# Patient Record
Sex: FEMALE | Race: BLACK OR AFRICAN AMERICAN | NOT HISPANIC OR LATINO | URBAN - METROPOLITAN AREA
[De-identification: names, ages, dates, MRNs, and addresses within clinical notes are randomized per-mention and may not be internally consistent; named-entity substitution may affect disease eponyms.]

---

## 2021-06-28 ENCOUNTER — OUTPATIENT (OUTPATIENT)
Dept: OUTPATIENT SERVICES | Facility: HOSPITAL | Age: 51
LOS: 1 days | End: 2021-06-28
Payer: COMMERCIAL

## 2021-06-28 DIAGNOSIS — Z01.818 ENCOUNTER FOR OTHER PREPROCEDURAL EXAMINATION: ICD-10-CM

## 2021-06-28 LAB
ALBUMIN SERPL ELPH-MCNC: 3.9 G/DL — SIGNIFICANT CHANGE UP (ref 3.3–5)
ALP SERPL-CCNC: 61 U/L — SIGNIFICANT CHANGE UP (ref 40–120)
ALT FLD-CCNC: 11 U/L — SIGNIFICANT CHANGE UP (ref 10–45)
ANION GAP SERPL CALC-SCNC: 7 MMOL/L — SIGNIFICANT CHANGE UP (ref 5–17)
APTT BLD: 29.3 SEC — SIGNIFICANT CHANGE UP (ref 27.5–35.5)
AST SERPL-CCNC: 24 U/L — SIGNIFICANT CHANGE UP (ref 10–40)
BILIRUB SERPL-MCNC: 0.2 MG/DL — SIGNIFICANT CHANGE UP (ref 0.2–1.2)
BLD GP AB SCN SERPL QL: NEGATIVE — SIGNIFICANT CHANGE UP
BLD GP AB SCN SERPL QL: NEGATIVE — SIGNIFICANT CHANGE UP
BUN SERPL-MCNC: 7 MG/DL — SIGNIFICANT CHANGE UP (ref 7–23)
CALCIUM SERPL-MCNC: 9.2 MG/DL — SIGNIFICANT CHANGE UP (ref 8.4–10.5)
CHLORIDE SERPL-SCNC: 104 MMOL/L — SIGNIFICANT CHANGE UP (ref 96–108)
CO2 SERPL-SCNC: 26 MMOL/L — SIGNIFICANT CHANGE UP (ref 22–31)
CREAT SERPL-MCNC: 0.77 MG/DL — SIGNIFICANT CHANGE UP (ref 0.5–1.3)
GLUCOSE SERPL-MCNC: 74 MG/DL — SIGNIFICANT CHANGE UP (ref 70–99)
HCG SERPL-ACNC: <0 MIU/ML — SIGNIFICANT CHANGE UP
HCT VFR BLD CALC: 35.3 % — SIGNIFICANT CHANGE UP (ref 34.5–45)
HGB BLD-MCNC: 11.8 G/DL — SIGNIFICANT CHANGE UP (ref 11.5–15.5)
INR BLD: 1.02 — SIGNIFICANT CHANGE UP (ref 0.88–1.16)
MCHC RBC-ENTMCNC: 29.8 PG — SIGNIFICANT CHANGE UP (ref 27–34)
MCHC RBC-ENTMCNC: 33.4 GM/DL — SIGNIFICANT CHANGE UP (ref 32–36)
MCV RBC AUTO: 89.1 FL — SIGNIFICANT CHANGE UP (ref 80–100)
NRBC # BLD: 0 /100 WBCS — SIGNIFICANT CHANGE UP (ref 0–0)
PLATELET # BLD AUTO: 181 K/UL — SIGNIFICANT CHANGE UP (ref 150–400)
POTASSIUM SERPL-MCNC: 4.1 MMOL/L — SIGNIFICANT CHANGE UP (ref 3.5–5.3)
POTASSIUM SERPL-SCNC: 4.1 MMOL/L — SIGNIFICANT CHANGE UP (ref 3.5–5.3)
PROT SERPL-MCNC: 7.1 G/DL — SIGNIFICANT CHANGE UP (ref 6–8.3)
PROTHROM AB SERPL-ACNC: 12.2 SEC — SIGNIFICANT CHANGE UP (ref 10.6–13.6)
RBC # BLD: 3.96 M/UL — SIGNIFICANT CHANGE UP (ref 3.8–5.2)
RBC # FLD: 12.4 % — SIGNIFICANT CHANGE UP (ref 10.3–14.5)
RH IG SCN BLD-IMP: POSITIVE — SIGNIFICANT CHANGE UP
RH IG SCN BLD-IMP: POSITIVE — SIGNIFICANT CHANGE UP
SODIUM SERPL-SCNC: 137 MMOL/L — SIGNIFICANT CHANGE UP (ref 135–145)
WBC # BLD: 3.48 K/UL — LOW (ref 3.8–10.5)
WBC # FLD AUTO: 3.48 K/UL — LOW (ref 3.8–10.5)

## 2021-06-28 PROCEDURE — 71046 X-RAY EXAM CHEST 2 VIEWS: CPT

## 2021-06-28 PROCEDURE — 93005 ELECTROCARDIOGRAM TRACING: CPT

## 2021-06-28 PROCEDURE — 84702 CHORIONIC GONADOTROPIN TEST: CPT

## 2021-06-28 PROCEDURE — 80053 COMPREHEN METABOLIC PANEL: CPT

## 2021-06-28 PROCEDURE — 85610 PROTHROMBIN TIME: CPT

## 2021-06-28 PROCEDURE — 71046 X-RAY EXAM CHEST 2 VIEWS: CPT | Mod: 26

## 2021-06-28 PROCEDURE — 93010 ELECTROCARDIOGRAM REPORT: CPT

## 2021-06-28 PROCEDURE — 85027 COMPLETE CBC AUTOMATED: CPT

## 2021-06-28 PROCEDURE — 86900 BLOOD TYPING SEROLOGIC ABO: CPT

## 2021-06-28 PROCEDURE — 86901 BLOOD TYPING SEROLOGIC RH(D): CPT

## 2021-06-28 PROCEDURE — 86850 RBC ANTIBODY SCREEN: CPT

## 2021-06-28 PROCEDURE — 85730 THROMBOPLASTIN TIME PARTIAL: CPT

## 2021-06-29 VITALS
RESPIRATION RATE: 18 BRPM | HEIGHT: 62 IN | OXYGEN SATURATION: 98 % | WEIGHT: 175.27 LBS | HEART RATE: 68 BPM | TEMPERATURE: 98 F | SYSTOLIC BLOOD PRESSURE: 103 MMHG | DIASTOLIC BLOOD PRESSURE: 68 MMHG

## 2021-06-30 ENCOUNTER — INPATIENT (INPATIENT)
Facility: HOSPITAL | Age: 51
LOS: 1 days | Discharge: HOME | DRG: 743 | End: 2021-07-02
Attending: OBSTETRICS & GYNECOLOGY | Admitting: OBSTETRICS & GYNECOLOGY
Payer: COMMERCIAL

## 2021-06-30 DIAGNOSIS — Z98.890 OTHER SPECIFIED POSTPROCEDURAL STATES: Chronic | ICD-10-CM

## 2021-06-30 DIAGNOSIS — K60.3 ANAL FISTULA: Chronic | ICD-10-CM

## 2021-06-30 LAB
GLUCOSE BLDC GLUCOMTR-MCNC: 85 MG/DL — SIGNIFICANT CHANGE UP (ref 70–99)
GLUCOSE BLDC GLUCOMTR-MCNC: 89 MG/DL — SIGNIFICANT CHANGE UP (ref 70–99)
GLUCOSE BLDC GLUCOMTR-MCNC: 91 MG/DL — SIGNIFICANT CHANGE UP (ref 70–99)
HCT VFR BLD CALC: 34.8 % — SIGNIFICANT CHANGE UP (ref 34.5–45)
HGB BLD-MCNC: 11.7 G/DL — SIGNIFICANT CHANGE UP (ref 11.5–15.5)
MCHC RBC-ENTMCNC: 30.2 PG — SIGNIFICANT CHANGE UP (ref 27–34)
MCHC RBC-ENTMCNC: 33.6 GM/DL — SIGNIFICANT CHANGE UP (ref 32–36)
MCV RBC AUTO: 89.7 FL — SIGNIFICANT CHANGE UP (ref 80–100)
NRBC # BLD: 0 /100 WBCS — SIGNIFICANT CHANGE UP (ref 0–0)
PLATELET # BLD AUTO: 146 K/UL — LOW (ref 150–400)
RBC # BLD: 3.88 M/UL — SIGNIFICANT CHANGE UP (ref 3.8–5.2)
RBC # FLD: 12.5 % — SIGNIFICANT CHANGE UP (ref 10.3–14.5)
WBC # BLD: 7.63 K/UL — SIGNIFICANT CHANGE UP (ref 3.8–10.5)
WBC # FLD AUTO: 7.63 K/UL — SIGNIFICANT CHANGE UP (ref 3.8–10.5)

## 2021-06-30 PROCEDURE — 74018 RADEX ABDOMEN 1 VIEW: CPT | Mod: 26

## 2021-06-30 PROCEDURE — 88307 TISSUE EXAM BY PATHOLOGIST: CPT | Mod: 26

## 2021-06-30 RX ORDER — ONDANSETRON 8 MG/1
4 TABLET, FILM COATED ORAL ONCE
Refills: 0 | Status: DISCONTINUED | OUTPATIENT
Start: 2021-06-30 | End: 2021-07-02

## 2021-06-30 RX ORDER — ACETAMINOPHEN 500 MG
1000 TABLET ORAL EVERY 6 HOURS
Refills: 0 | Status: DISCONTINUED | OUTPATIENT
Start: 2021-06-30 | End: 2021-07-02

## 2021-06-30 RX ORDER — SIMETHICONE 80 MG/1
80 TABLET, CHEWABLE ORAL EVERY 6 HOURS
Refills: 0 | Status: DISCONTINUED | OUTPATIENT
Start: 2021-06-30 | End: 2021-07-02

## 2021-06-30 RX ORDER — KETOROLAC TROMETHAMINE 30 MG/ML
30 SYRINGE (ML) INJECTION EVERY 8 HOURS
Refills: 0 | Status: DISCONTINUED | OUTPATIENT
Start: 2021-06-30 | End: 2021-07-01

## 2021-06-30 RX ORDER — OXYCODONE HYDROCHLORIDE 5 MG/1
10 TABLET ORAL EVERY 6 HOURS
Refills: 0 | Status: DISCONTINUED | OUTPATIENT
Start: 2021-06-30 | End: 2021-07-02

## 2021-06-30 RX ORDER — OXYCODONE HYDROCHLORIDE 5 MG/1
5 TABLET ORAL EVERY 6 HOURS
Refills: 0 | Status: DISCONTINUED | OUTPATIENT
Start: 2021-06-30 | End: 2021-07-02

## 2021-06-30 RX ORDER — HYDROMORPHONE HYDROCHLORIDE 2 MG/ML
0.5 INJECTION INTRAMUSCULAR; INTRAVENOUS; SUBCUTANEOUS
Refills: 0 | Status: DISCONTINUED | OUTPATIENT
Start: 2021-06-30 | End: 2021-07-02

## 2021-06-30 RX ORDER — SODIUM CHLORIDE 9 MG/ML
1000 INJECTION, SOLUTION INTRAVENOUS
Refills: 0 | Status: DISCONTINUED | OUTPATIENT
Start: 2021-06-30 | End: 2021-07-01

## 2021-06-30 RX ORDER — BUPIVACAINE 13.3 MG/ML
20 INJECTION, SUSPENSION, LIPOSOMAL INFILTRATION ONCE
Refills: 0 | Status: DISCONTINUED | OUTPATIENT
Start: 2021-06-30 | End: 2021-07-02

## 2021-06-30 RX ORDER — IBUPROFEN 200 MG
600 TABLET ORAL EVERY 6 HOURS
Refills: 0 | Status: DISCONTINUED | OUTPATIENT
Start: 2021-06-30 | End: 2021-07-02

## 2021-06-30 RX ORDER — SENNA PLUS 8.6 MG/1
1 TABLET ORAL AT BEDTIME
Refills: 0 | Status: DISCONTINUED | OUTPATIENT
Start: 2021-06-30 | End: 2021-07-02

## 2021-06-30 RX ADMIN — Medication 1000 MILLIGRAM(S): at 23:09

## 2021-06-30 RX ADMIN — Medication 30 MILLIGRAM(S): at 23:09

## 2021-06-30 NOTE — BRIEF OPERATIVE NOTE - NSICDXBRIEFPREOP_GEN_ALL_CORE_FT
PRE-OP DIAGNOSIS:  Fibroid uterus 30-Jun-2021 15:53:00  Corin Joseph  Dermoid cyst of left ovary 30-Jun-2021 15:53:18  Corin Joseph

## 2021-06-30 NOTE — H&P ADULT - NSHPLABSRESULTS_GEN_ALL_CORE
LABS:                        11.8   3.48  )-----------( 181      ( 28 Jun 2021 16:54 )             35.3     06-28    137  |  104  |  7   ----------------------------<  74  4.1   |  26  |  0.77    Ca    9.2      28 Jun 2021 16:54    TPro  7.1  /  Alb  3.9  /  TBili  0.2  /  DBili  x   /  AST  24  /  ALT  11  /  AlkPhos  61  06-28    PT/INR - ( 28 Jun 2021 16:54 )   PT: 12.2 sec;   INR: 1.02          PTT - ( 28 Jun 2021 16:54 )  PTT:29.3 sec

## 2021-06-30 NOTE — H&P ADULT - ASSESSMENT
51yo G0 with a large fibroid uterus, menometrorrhagia and pelvic bulk symptoms, as well as L ovarian dermoid cyst, here fro a ERLIN, BS and ovarian cystectomy. Pt currently feeling well.     - COVID neg  - Consented  - NPO

## 2021-06-30 NOTE — BRIEF OPERATIVE NOTE - OPERATION/FINDINGS
Normal genitalia, 30w size bulky uterus up to the xiphoid. Mckeon and vaginal prep. Vertical incision from the pubis to the umbilicus. The uterus was exteriorized with no difficulty. Ligasure used for salpingectomy and separation of the mesovarium, clamps and suturing on the uterine vessels. the bladder was reflected. The uterus was amputated from the cervix with a Bovie. Reversed cone performed. pedicles inspected. Excellent hemostasis. Surgicel powder placed in the cervix. EBL 300cc.

## 2021-06-30 NOTE — BRIEF OPERATIVE NOTE - NSICDXBRIEFPROCEDURE_GEN_ALL_CORE_FT
PROCEDURES:  Open supracervical hysterectomy 30-Jun-2021 15:52:20  Corin Joseph  Bilateral salpingectomy 30-Jun-2021 15:52:35  Corin Joseph  Left oophorectomy 30-Jun-2021 15:52:45  Corin Joseph

## 2021-06-30 NOTE — H&P ADULT - HISTORY OF PRESENT ILLNESS
49yo G0 with a large fibroid uterus, menometrorrhagia and pelvic bulk symptoms, as well as L ovarian dermoid cyst, here fro a ERLIN, BS and ovarian cystectomy. Pt currently feeling well.     Recent CT 4/13/21: Enlarged fibroid uterus to 25cm with extension to the RUQ, largest myoma 12.8cm. Left ovarian dermoid 4.2cm. Cholelithiasis and small hiatal hernia.     OBHx: G0  GYNHx: Fibroid uterus, menometrorrhagia and pelvic bulk symptoms. L ovarian cyst known since 2019 with dermoid features. No PID/STDs.   PMHx: Denies.   PSHx: gluteal cyst/abscess evacuation and resection.   Meds: none  NKDA

## 2021-07-01 LAB
COVID-19 SPIKE DOMAIN AB INTERP: POSITIVE
COVID-19 SPIKE DOMAIN ANTIBODY RESULT: 94 U/ML — HIGH
HCT VFR BLD CALC: 33.6 % — LOW (ref 34.5–45)
HGB BLD-MCNC: 11 G/DL — LOW (ref 11.5–15.5)
MCHC RBC-ENTMCNC: 30.1 PG — SIGNIFICANT CHANGE UP (ref 27–34)
MCHC RBC-ENTMCNC: 32.7 GM/DL — SIGNIFICANT CHANGE UP (ref 32–36)
MCV RBC AUTO: 91.8 FL — SIGNIFICANT CHANGE UP (ref 80–100)
NRBC # BLD: 0 /100 WBCS — SIGNIFICANT CHANGE UP (ref 0–0)
PLATELET # BLD AUTO: 158 K/UL — SIGNIFICANT CHANGE UP (ref 150–400)
RBC # BLD: 3.66 M/UL — LOW (ref 3.8–5.2)
RBC # FLD: 12.6 % — SIGNIFICANT CHANGE UP (ref 10.3–14.5)
SARS-COV-2 IGG+IGM SERPL QL IA: 94 U/ML — HIGH
SARS-COV-2 IGG+IGM SERPL QL IA: POSITIVE
WBC # BLD: 6.54 K/UL — SIGNIFICANT CHANGE UP (ref 3.8–10.5)
WBC # FLD AUTO: 6.54 K/UL — SIGNIFICANT CHANGE UP (ref 3.8–10.5)

## 2021-07-01 RX ORDER — ENOXAPARIN SODIUM 100 MG/ML
40 INJECTION SUBCUTANEOUS EVERY 24 HOURS
Refills: 0 | Status: DISCONTINUED | OUTPATIENT
Start: 2021-07-01 | End: 2021-07-02

## 2021-07-01 RX ADMIN — Medication 30 MILLIGRAM(S): at 05:33

## 2021-07-01 RX ADMIN — Medication 30 MILLIGRAM(S): at 05:48

## 2021-07-01 RX ADMIN — Medication 30 MILLIGRAM(S): at 13:20

## 2021-07-01 RX ADMIN — ENOXAPARIN SODIUM 40 MILLIGRAM(S): 100 INJECTION SUBCUTANEOUS at 07:57

## 2021-07-01 RX ADMIN — Medication 1000 MILLIGRAM(S): at 05:33

## 2021-07-01 RX ADMIN — Medication 1000 MILLIGRAM(S): at 17:18

## 2021-07-01 RX ADMIN — Medication 1000 MILLIGRAM(S): at 18:00

## 2021-07-01 RX ADMIN — Medication 1000 MILLIGRAM(S): at 06:03

## 2021-07-01 RX ADMIN — Medication 1000 MILLIGRAM(S): at 11:31

## 2021-07-01 RX ADMIN — Medication 30 MILLIGRAM(S): at 13:05

## 2021-07-01 RX ADMIN — Medication 1000 MILLIGRAM(S): at 12:00

## 2021-07-02 VITALS
TEMPERATURE: 99 F | RESPIRATION RATE: 17 BRPM | HEART RATE: 59 BPM | OXYGEN SATURATION: 98 % | SYSTOLIC BLOOD PRESSURE: 116 MMHG | DIASTOLIC BLOOD PRESSURE: 74 MMHG

## 2021-07-02 PROCEDURE — 82962 GLUCOSE BLOOD TEST: CPT

## 2021-07-02 PROCEDURE — 88307 TISSUE EXAM BY PATHOLOGIST: CPT

## 2021-07-02 PROCEDURE — 85027 COMPLETE CBC AUTOMATED: CPT

## 2021-07-02 PROCEDURE — 86901 BLOOD TYPING SEROLOGIC RH(D): CPT

## 2021-07-02 PROCEDURE — 86850 RBC ANTIBODY SCREEN: CPT

## 2021-07-02 PROCEDURE — 86769 SARS-COV-2 COVID-19 ANTIBODY: CPT

## 2021-07-02 PROCEDURE — 74018 RADEX ABDOMEN 1 VIEW: CPT

## 2021-07-02 PROCEDURE — 86900 BLOOD TYPING SEROLOGIC ABO: CPT

## 2021-07-02 PROCEDURE — 36415 COLL VENOUS BLD VENIPUNCTURE: CPT

## 2021-07-02 RX ADMIN — Medication 1000 MILLIGRAM(S): at 04:29

## 2021-07-02 RX ADMIN — ENOXAPARIN SODIUM 40 MILLIGRAM(S): 100 INJECTION SUBCUTANEOUS at 06:10

## 2021-07-02 RX ADMIN — Medication 1000 MILLIGRAM(S): at 03:59

## 2021-07-02 RX ADMIN — Medication 1000 MILLIGRAM(S): at 09:40

## 2021-07-02 NOTE — PROGRESS NOTE ADULT - SUBJECTIVE AND OBJECTIVE BOX
Discharge Note  Patient reports mild incisional pain, relieved by tylenol. +Flatus, tolerating regular diet.  VS: afebrile  Abd: soft, no tenderness. Midline incision with steri dry and intact  Pelvic; scant pinkish discharge  Ext; iona's neg  IMP: Post-op day 2 stable/ cholelithiasis  Plan: d/c home           motrin prn for pain           f/u wound check on7/12
Patient evaluated at the bedside. No acute events.  Denies CP/SOB/dizziness/nausea/vomiting/abdominal pain/calf pain.  Pain well controlled on oral pain medications. Patient is ambulating independently, passing flatus and tolerating a regular diet.    O:   T(C): 37.3 (07-02-21 @ 04:53), Max: 37.3 (07-02-21 @ 04:53)  HR: 58 (07-02-21 @ 04:53) (53 - 69)  BP: 123/79 (07-02-21 @ 04:53) (103/65 - 123/79)  RR: 17 (07-02-21 @ 04:53) (16 - 18)  SpO2: 98% (07-02-21 @ 04:53) (97% - 98%)  Wt(kg): --    GEN: patient appears well  LUNGS: no respiratory distress  ABD: soft, appropriately tender, non distended, incision clean and dry  EXT: no calf tenderness      07-01 @ 07:01  -  07-02 @ 07:00  --------------------------------------------------------  IN: 1200 mL / OUT: 1426 mL / NET: -226 mL    LABS:                        11.0   6.54  )-----------( 158      ( 01 Jul 2021 10:14 )             33.6                   
Patient evaluated at the bedside. No acute events. She is feeling excellent, denies pain, N/V. She is ambulating and tolerating clear diet and passing gas.       O:   T(C): 37.1 (07-01-21 @ 05:24), Max: 37.2 (06-30-21 @ 20:10)  HR: 54 (07-01-21 @ 05:24) (52 - 68)  BP: 107/66 (07-01-21 @ 05:24) (107/66 - 129/74)  RR: 16 (07-01-21 @ 05:24) (10 - 16)  SpO2: 98% (07-01-21 @ 05:24) (94% - 100%)  Wt(kg): --    GEN: patient appears well  LUNGS: no respiratory distress  ABD: soft, appropriately tender, non distended, incision clean and dry, BS+  EXT: no calf tenderness      06-30 @ 07:01  -  07-01 @ 07:00  --------------------------------------------------------  IN: 1525 mL / OUT: 1155 mL / NET: 370 mL      WBC Count: 7.63 K/uL (06-30-21 @ 20:36)  Hemoglobin: 11.7 g/dL (06-30-21 @ 20:36)    7/1 AM labs pending.          
GYN POC    Pt seen and examined at bedside. Feeling well, no complaints. Tolerating water.   Pt denies any fever, chills, chest pain, SOB, nausea or vomiting     T(F): 98.9 (06-30-21 @ 20:10), Max: 98.9 (06-30-21 @ 20:10)  HR: 61 (06-30-21 @ 20:10) (52 - 68)  BP: 118/74 (06-30-21 @ 20:10) (116/75 - 129/74)  RR: 16 (06-30-21 @ 20:10) (10 - 16)  SpO2: 96% (06-30-21 @ 20:10) (94% - 100%)  Wt(kg): --    06-30 @ 07:01  -  06-30 @ 22:44  --------------------------------------------------------  IN: 275 mL / OUT: 505 mL / NET: -230 mL        acetaminophen   Tablet .. 1000 milliGRAM(s) Oral every 6 hours  acetaminophen   Tablet .. 1000 milliGRAM(s) Oral every 6 hours PRN Mild Pain (1 - 3)  BUpivacaine liposome 1.3% Injectable (no eMAR) 20 milliLiter(s) Local Injection once  HYDROmorphone  Injectable 0.5 milliGRAM(s) IV Push every 15 minutes PRN Severe Pain (7 - 10)  ibuprofen  Tablet. 600 milliGRAM(s) Oral every 6 hours PRN Mild Pain (1 - 3)  ketorolac   Injectable 30 milliGRAM(s) IV Push every 8 hours  lactated ringers. 1000 milliLiter(s) IV Continuous <Continuous>  ondansetron Injectable 4 milliGRAM(s) IV Push once  oxyCODONE    IR 5 milliGRAM(s) Oral every 6 hours PRN Moderate Pain (4 - 6)  oxyCODONE    IR 10 milliGRAM(s) Oral every 6 hours PRN Severe Pain (7 - 10)  senna 1 Tablet(s) Oral at bedtime PRN Constipation  simethicone 80 milliGRAM(s) Chew every 6 hours PRN Gas      Physical exam:  Constitutional: NAD  Pulmonary: clear to auscultation bilaterally  Cardiovascular: regular rate and rhythm  Abdomen: abd bandage in place. Soft, mildly tender, nondistended, diminished BS  Extremities: no lower extremity edema, or calve tenderness. SCDs in place    
Attending Note  Patient feels well, no incisional discomfort. Tolerating diet and ambulating  VS: stable afebrile  Abd- soft, incision with steri dry and intact  Pelvic- no vag bleeding  Ext: iona'sneg  ~                      11.7   7.63  )-----------( 146      ( 30 Jun 2021 20:36 )             34.8   IMP:post-op day 1 s/p RYLEY/ left adnexectomy for left dermoid cyst and right salpingectomy  Plan: ambulate           advance diet           anticipate discharge in am

## 2021-07-02 NOTE — PROGRESS NOTE ADULT - ASSESSMENT
A: 49 yo POD0 s/p ex lap RYLEY, BS, LO, evaluated for post operative check, stable    Plan:  1. Vital signs stable, continue to monitor per protocol  2. Pain control  3. DVT prophylaxis: SCDs  4. CV: IVH   5. Pulm: Incentive spirometer (at least 10 times per hour while awake)   6. GI: Diet CLD  7. : Mckeon   8. Follow up labs: AM CBC  9. Activity: bedrest tonight 
50y POD 2 s/p ex lap RYLEY, BS, LO. Meeting postop milestones appropriately.     1. ID: None   2. FEN/GI - Advance to regular diet as tolerated.   3. PAIN - Controlled with pain medications  4.  - voiding spontneously  5. RESP -  No acute issues.   6. VTE prophylaxis - SCDs, ambulate as tolerated. Lovenox 40mg daily.  7. LABS - Hg 11.0, WBC 6.54  8. DISPO -  safe for discharge.   
Assessment and Plan:   50y POD 1 s/p ex lap RYLEY, BS, LO. Meeting postop milestones appropriately.     1. ID: None   2. FEN/GI - Advance to regular diet as tolerated.   3. PAIN - Controlled with pain medications  4.  -  levy catheter in place- removed at this time, f/u TOV by 16:00.  5. RESP -  No acute issues.   6. VTE prophylaxis - SCDs, ambulate as tolerated. Lovenox 40mg daily.  7. LABS -  pending this AM  8. DISPO -   discharge on POD 2 if stable and meeting postop milestones

## 2021-07-02 NOTE — DISCHARGE NOTE NURSING/CASE MANAGEMENT/SOCIAL WORK - PATIENT PORTAL LINK FT
You can access the FollowMyHealth Patient Portal offered by [facility] by registering at the following website: [email or web address]. By joining [facility] FollowMyHealth portal, you will also be able to view your health information using other applications (apps) compatible with our system.

## 2021-07-02 NOTE — DISCHARGE NOTE PROVIDER - CARE PROVIDER_API CALL
Estrada Kenney  OBSTETRICS AND GYNECOLOGY  [address], [place] [address]  Phone: [phone]  Fax: [phone]  Follow Up Time:

## 2021-07-02 NOTE — DISCHARGE NOTE PROVIDER - HOSPITAL COURSE
50y was admitted for an Ex lap RYLEY, BS, LO. Uncomplicated surgery and postoperative course. Prior to discharge she met all postoperative milestones. Her labs and vital signs are stable and pain is well controlled. She will follow up with her GYN doctor in two weeks for postop check.

## 2021-07-14 LAB — SURGICAL PATHOLOGY STUDY: SIGNIFICANT CHANGE UP

## 2021-07-16 DIAGNOSIS — D25.2 SUBSEROSAL LEIOMYOMA OF UTERUS: ICD-10-CM

## 2021-07-16 DIAGNOSIS — K80.20 CALCULUS OF GALLBLADDER WITHOUT CHOLECYSTITIS WITHOUT OBSTRUCTION: ICD-10-CM

## 2021-07-16 DIAGNOSIS — D27.9 BENIGN NEOPLASM OF UNSPECIFIED OVARY: ICD-10-CM
